# Patient Record
Sex: FEMALE | Race: OTHER | NOT HISPANIC OR LATINO | URBAN - METROPOLITAN AREA
[De-identification: names, ages, dates, MRNs, and addresses within clinical notes are randomized per-mention and may not be internally consistent; named-entity substitution may affect disease eponyms.]

---

## 2020-01-01 ENCOUNTER — EMERGENCY (EMERGENCY)
Facility: HOSPITAL | Age: 65
LOS: 1 days | End: 2020-01-01
Attending: EMERGENCY MEDICINE | Admitting: EMERGENCY MEDICINE
Payer: COMMERCIAL

## 2020-01-01 VITALS — TEMPERATURE: 96 F

## 2020-01-01 VITALS — WEIGHT: 152.12 LBS | HEIGHT: 64 IN

## 2020-01-01 PROCEDURE — 99285 EMERGENCY DEPT VISIT HI MDM: CPT | Mod: 25

## 2020-01-01 PROCEDURE — 92950 HEART/LUNG RESUSCITATION CPR: CPT

## 2020-04-12 NOTE — ED PROVIDER NOTE - CLINICAL SUMMARY MEDICAL DECISION MAKING FREE TEXT BOX
Arrived in extremis, given 6 rounds of epi and 2 rounds of sodium bicarb in ED in addition to 9 rounds given pre-hospital. Regained pulses for a matter of seconds at one point but almost immediately manolo'd down and lost pulses. Remained in PEA throughout the rest of the resuscitation. POCUS used during pulse checks, revealed repeated lack of cardiac activity. After >90min since reported arrest, >1hr of persistent unsuccessful resuscitation, time of death called at 11:06 Arrived in extremis, given 6 rounds of epi and 2 rounds of sodium bicarb in ED in addition to 9 rounds given pre-hospital. Regained pulses for a matter of seconds at one point but almost immediately manolo'd down and lost pulses. Remained in PEA throughout the rest of the resuscitation. POCUS used during pulse checks, revealed repeated lack of cardiac activity. After >90min since reported arrest, >1hr of persistent unsuccessful resuscitation, time of death called at 11:06.    Case reported to ME's office. Case not accepted per director of ME, Tarun Archer.

## 2020-04-12 NOTE — ED PROVIDER NOTE - PROGRESS NOTE DETAILS
Pt has 2 bothers, Jonathan Ar (c) [phone] (h) [phone] and Homer Joyner (c) [phone]. Pt's  is mentally disabled/non-verbal/bedbound with 24hr care giver. Pt's brother-in-law, Sandeep Stallings, cell: [phone], called to provide information as to how to reach family members. States that power-of- likely lies with one of the brothers, as her  does not have capacity.

## 2020-04-12 NOTE — ED PROVIDER NOTE - PHYSICAL EXAMINATION
CONSTITUTIONAL: Well developed female, cold/unresponsive/pulseless w/compressions in progress, intubated being bagged  ENT: + intubated 22cm at the lip, air fogging tube, no oral bleeding or e/o trauma.  EYES: fixed/dilated pupils b/l  RESPIRATORY: b/l symmetric breath sounds, being bagged/intubated  MSK: no deformities noted, no edema, no e/o trauma, no peripheral pulses  NEURO: unresponsive  SKIN: cold and pale

## 2020-04-12 NOTE — ED PROVIDER NOTE - OBJECTIVE STATEMENT
59 y/o F BIBEMS in cardiac arrest, intubated w/strong b/l breath sounds being bagged and abimael CPR machine well positioned providing compressions, receiving ACLS medications via R tibia IO. Per EMS, pt lives at home alone with demented , arrest was "witnessed" by  at approximately 9:30PM, EMS arrived at approximately 9:45PM w/no compressions in progress. Intubated upon arrival w/cord visualization, color change, + end tidal capnography and symmetric breath sounds. Placed on Abimael machine, given a total of 9 rounds of epi, 1 amp bicarb, 1 round CaCl, PEA throughout resuscitation. Never obtained ROSC.    Of note, EMS reports that pt has no known medical problems, was currently being treated for upper respiratory infection, possible COVID infection, on inhaler and z-pack.

## 2020-04-12 NOTE — ED ADULT TRIAGE NOTE - CHIEF COMPLAINT QUOTE
found in asystole by EMS  down for 51 mins -47 mins worth of compressions   7 epi 80 bicarb, 1 gm of calcium, right tib IO 7 ETT placed by EMS  As per EMS family told them she is currently being treated for COVID on plaquenil

## 2020-04-13 NOTE — ED ADULT NURSE NOTE - OBJECTIVE STATEMENT
Pt BIBA s/p cardiac arrest with CPR in progress. CPR continued in ED, see CPR flow sheet. IO in place on arrival  Pt was pronounced at 23:06.

## 2020-04-16 DIAGNOSIS — I46.9 CARDIAC ARREST, CAUSE UNSPECIFIED: ICD-10-CM

## 2024-10-09 NOTE — ED ADULT TRIAGE NOTE - HEIGHT IN INCHES
4